# Patient Record
Sex: MALE | Race: WHITE | NOT HISPANIC OR LATINO
[De-identification: names, ages, dates, MRNs, and addresses within clinical notes are randomized per-mention and may not be internally consistent; named-entity substitution may affect disease eponyms.]

---

## 2022-06-08 ENCOUNTER — NON-APPOINTMENT (OUTPATIENT)
Age: 62
End: 2022-06-08

## 2022-12-31 ENCOUNTER — NON-APPOINTMENT (OUTPATIENT)
Age: 62
End: 2022-12-31

## 2023-01-04 PROBLEM — Z00.00 ENCOUNTER FOR PREVENTIVE HEALTH EXAMINATION: Status: ACTIVE | Noted: 2023-01-04

## 2023-01-05 ENCOUNTER — APPOINTMENT (OUTPATIENT)
Dept: ORTHOPEDIC SURGERY | Facility: CLINIC | Age: 63
End: 2023-01-05
Payer: COMMERCIAL

## 2023-01-05 VITALS — WEIGHT: 180 LBS | BODY MASS INDEX: 25.77 KG/M2 | HEIGHT: 70 IN | RESPIRATION RATE: 16 BRPM

## 2023-01-05 DIAGNOSIS — Z80.9 FAMILY HISTORY OF MALIGNANT NEOPLASM, UNSPECIFIED: ICD-10-CM

## 2023-01-05 DIAGNOSIS — Z87.39 PERSONAL HISTORY OF OTHER DISEASES OF THE MUSCULOSKELETAL SYSTEM AND CONNECTIVE TISSUE: ICD-10-CM

## 2023-01-05 DIAGNOSIS — M72.0 PALMAR FASCIAL FIBROMATOSIS [DUPUYTREN]: ICD-10-CM

## 2023-01-05 DIAGNOSIS — Z78.9 OTHER SPECIFIED HEALTH STATUS: ICD-10-CM

## 2023-01-05 DIAGNOSIS — M79.645 PAIN IN LEFT FINGER(S): ICD-10-CM

## 2023-01-05 PROCEDURE — 99203 OFFICE O/P NEW LOW 30 MIN: CPT

## 2023-01-05 PROCEDURE — 73130 X-RAY EXAM OF HAND: CPT | Mod: 50

## 2023-01-05 NOTE — HISTORY OF PRESENT ILLNESS
[Right] : right hand dominant [FreeTextEntry1] : Patient presents with left hand Dupuytren's disease of the palm and left ring PIP joint pain.  His symptoms began a month ago and he denies any injury.\par Patient reports feeling discomfort but no pain on the left hand.  He complains of inability to to bend the left ring finger to a complete fist

## 2023-01-05 NOTE — ASSESSMENT
[FreeTextEntry1] : Asymptomatic left fourth Dupuytren's without contracture\par \par Diffuse arthritis with symptoms occurring at the level of the left fourth digit of 1 years duration.\par \par The risks, benefits, alternatives and associated differential diagnosis was discussed with the patient. Options ranged from conservative care, therapy to surgical intervention were reviewed and all questions answered. Risks included incomplete resolution of symptoms, worsening of symptoms recurrence, tissue loss, functional loss and other risks associated with treatment of this condition Patient appeared to have an excellent understanding of the risks as well as differential diagnosis associated with this condition.\par \par This range from conservative management, therapy, splinting, injection, or surgical reconstruction.\par \par Risk associated with each option discussed and all questions answered.\par \par Observation was chosen for the left fourth Dupuytren's since there is no contracture and is clinically asymptomatic.\par \par He elected conservative management for the left fourth PIP joint which included rest activity modifications Coban wrap and home program.\par \par The patient elected holistic medicines such as topical Arnica cream and oral ginger. Intermittent anti-inflammatories were also discussed and the risk associated with both holistic and traditional medicines were discussed and all questions answered.\par \par If other joints become affected he may consider rheumatologic work-up.\par \par It is hoped that this has a long-lasting beneficial therapeutic effect. If symptoms are not fully resolved by 4-6 weeks the patient was asked to return to my office for reevaluation. If symptoms are resolved they can return on an as-needed basis.\par \par If symptoms continue he may consider more aggressive forms of treatment such as therapy, injection.

## 2023-01-05 NOTE — PHYSICAL EXAM
[de-identified] : Physical exam shows the patient to be alert and oriented x3, capable of ambulation. The patient is well-developed and well-nourished in no apparent respiratory distress. Majority of the skin is intact bilaterally in the upper extremities without lymphadenopathy at the elbows.\par \par There is thickening over the pretendinous cord of the left fourth digit without contracture.  There is no tenderness of the cord or A1 pulley.  No tenderness or instability of the MP or DIP joint.\par \par There is tenderness of the left fourth PIP joint without evidence of instability with the FDS FDP and extensor mechanism intact.\par \par Range of motion of this digit 0/90 of the MP 0/85 the PIP and 0/80 the DIP joint.  He is missing 25 degrees terminal flexion of the PIP joint of the left fourth digit as compared to other digits.\par \par There is good capillary refill of the digits bilaterally.There is no masses or sensitivity over the median and ulnar nerves at the level of the wrist. There is a negative Tinel's and negative Phalen's sign bilaterally. The sensation is grossly intact bilaterally. [de-identified] : PA lateral and obliques of both hands shows diffuse degenerative changes of the DIP joints and joint space narrowing with sclerosis and small cyst formation at the level of the left fourth PIP joint.

## 2023-01-19 ENCOUNTER — TRANSCRIPTION ENCOUNTER (OUTPATIENT)
Age: 63
End: 2023-01-19